# Patient Record
Sex: FEMALE
[De-identification: names, ages, dates, MRNs, and addresses within clinical notes are randomized per-mention and may not be internally consistent; named-entity substitution may affect disease eponyms.]

---

## 2023-07-21 ENCOUNTER — DASHBOARD ENCOUNTERS (OUTPATIENT)
Age: 63
End: 2023-07-21

## 2023-07-21 ENCOUNTER — WEB ENCOUNTER (OUTPATIENT)
Dept: URBAN - METROPOLITAN AREA CLINIC 105 | Facility: CLINIC | Age: 63
End: 2023-07-21

## 2023-07-21 ENCOUNTER — OFFICE VISIT (OUTPATIENT)
Dept: URBAN - METROPOLITAN AREA CLINIC 105 | Facility: CLINIC | Age: 63
End: 2023-07-21
Payer: COMMERCIAL

## 2023-07-21 VITALS
BODY MASS INDEX: 42.52 KG/M2 | HEART RATE: 81 BPM | SYSTOLIC BLOOD PRESSURE: 136 MMHG | HEIGHT: 63 IN | TEMPERATURE: 97.5 F | WEIGHT: 240 LBS | DIASTOLIC BLOOD PRESSURE: 87 MMHG

## 2023-07-21 DIAGNOSIS — K57.90 DIVERTICULOSIS: ICD-10-CM

## 2023-07-21 DIAGNOSIS — I10 ESSENTIAL HYPERTENSION: ICD-10-CM

## 2023-07-21 DIAGNOSIS — K21.9 GASTROESOPHAGEAL REFLUX DISEASE, UNSPECIFIED WHETHER ESOPHAGITIS PRESENT: ICD-10-CM

## 2023-07-21 DIAGNOSIS — Z99.89 WALKER AS AMBULATION AID: ICD-10-CM

## 2023-07-21 DIAGNOSIS — E66.01 MORBID OBESITY: ICD-10-CM

## 2023-07-21 PROCEDURE — 99203 OFFICE O/P NEW LOW 30 MIN: CPT | Performed by: INTERNAL MEDICINE

## 2023-07-21 RX ORDER — OMEPRAZOLE 40 MG/1
CAPSULE, DELAYED RELEASE ORAL
Qty: 90 CAPSULE | Status: ACTIVE | COMMUNITY

## 2023-07-21 RX ORDER — PHENTERMINE HYDROCHLORIDE 37.5 MG/1
TAKE ONE TABLET BY MOUTH ONE TIME DAILY TABLET ORAL
Qty: 30 UNSPECIFIED | Refills: 0 | Status: ACTIVE | COMMUNITY

## 2023-07-21 RX ORDER — AMLODIPINE BESYLATE 10 MG/1
TAKE ONE TABLET BY MOUTH ONE TIME DAILY TABLET ORAL
Qty: 90 UNSPECIFIED | Refills: 0 | Status: ACTIVE | COMMUNITY

## 2023-07-21 RX ORDER — DICYCLOMINE HYDROCHLORIDE 20 MG/1
TAKE ONE TABLET BY MOUTH THREE TIMES A DAY AS NEEDED FOR SPASMS TABLET ORAL
Qty: 60 UNSPECIFIED | Refills: 0 | Status: ACTIVE | COMMUNITY

## 2023-07-21 RX ORDER — HYDROCHLOROTHIAZIDE 25 MG/1
TAKE ONE TABLET BY MOUTH EVERY MORNING TABLET ORAL
Qty: 90 UNSPECIFIED | Refills: 0 | Status: ACTIVE | COMMUNITY

## 2023-07-21 RX ORDER — GABAPENTIN 100 MG/1
TAKE ONE CAPSULE BY MOUTH THREE TIMES A DAY CAPSULE ORAL
Qty: 270 UNSPECIFIED | Refills: 0 | Status: ACTIVE | COMMUNITY

## 2023-07-21 RX ORDER — METOPROLOL TARTRATE 25 MG/1
TABLET ORAL
Qty: 90 TABLET | Status: ACTIVE | COMMUNITY

## 2023-07-21 RX ORDER — POLYETHYLENE GLYCOL-3350 AND ELECTROLYTES WITH FLAVOR PACK 240; 5.84; 2.98; 6.72; 22.72 G/278.26G; G/278.26G; G/278.26G; G/278.26G; G/278.26G
AS DIRECTED POWDER, FOR SOLUTION ORAL 1
Qty: 1 | Refills: 0 | OUTPATIENT
Start: 2023-07-25 | End: 2023-07-26

## 2023-07-21 NOTE — EXAM-PHYSICAL EXAM
Gen: Alert, oriented, in no distress Heent: no icterus, lips wnl Lungs: bilateral breath sounds CVS: first and second heart sounds Abdomen: full, soft, non tender Ext: no edema Joints: uses walker Spine: consistent with age Skin: no rash on exposed areas Neuro: no focal localizing signs

## 2023-07-21 NOTE — HPI-TODAY'S VISIT:
7/21/23 61 yo lady referred by DR Jami Cardona Needs colon screening Last colonoscopy in 2012 with diverticulosis No blood in stool No fhx of colon CA. She uses a walker.

## 2023-07-25 ENCOUNTER — LAB OUTSIDE AN ENCOUNTER (OUTPATIENT)
Dept: URBAN - METROPOLITAN AREA CLINIC 105 | Facility: CLINIC | Age: 63
End: 2023-07-25

## 2023-07-25 ENCOUNTER — TELEPHONE ENCOUNTER (OUTPATIENT)
Dept: URBAN - METROPOLITAN AREA CLINIC 3 | Facility: CLINIC | Age: 63
End: 2023-07-25

## 2023-07-25 PROBLEM — 895488007: Status: ACTIVE | Noted: 2023-07-25

## 2023-07-25 PROBLEM — 397881000: Status: ACTIVE | Noted: 2023-07-25

## 2023-07-25 PROBLEM — 235595009: Status: ACTIVE | Noted: 2023-07-25

## 2023-07-25 PROBLEM — 238136002: Status: ACTIVE | Noted: 2023-07-25

## 2023-07-25 PROBLEM — 59621000: Status: ACTIVE | Noted: 2023-07-25

## 2023-07-31 ENCOUNTER — TELEPHONE ENCOUNTER (OUTPATIENT)
Dept: URBAN - METROPOLITAN AREA CLINIC 105 | Facility: CLINIC | Age: 63
End: 2023-07-31